# Patient Record
Sex: MALE | Race: WHITE
[De-identification: names, ages, dates, MRNs, and addresses within clinical notes are randomized per-mention and may not be internally consistent; named-entity substitution may affect disease eponyms.]

---

## 2017-09-11 ENCOUNTER — HOSPITAL ENCOUNTER (INPATIENT)
Dept: HOSPITAL 54 - ER | Age: 77
LOS: 3 days | Discharge: HOME | DRG: 280 | End: 2017-09-14
Attending: INTERNAL MEDICINE | Admitting: INTERNAL MEDICINE
Payer: MEDICARE

## 2017-09-11 VITALS — WEIGHT: 175 LBS | BODY MASS INDEX: 26.52 KG/M2 | HEIGHT: 68 IN

## 2017-09-11 VITALS — DIASTOLIC BLOOD PRESSURE: 109 MMHG | SYSTOLIC BLOOD PRESSURE: 161 MMHG

## 2017-09-11 VITALS — SYSTOLIC BLOOD PRESSURE: 165 MMHG | DIASTOLIC BLOOD PRESSURE: 109 MMHG

## 2017-09-11 DIAGNOSIS — I95.1: ICD-10-CM

## 2017-09-11 DIAGNOSIS — I48.2: ICD-10-CM

## 2017-09-11 DIAGNOSIS — N17.9: ICD-10-CM

## 2017-09-11 DIAGNOSIS — F32.9: ICD-10-CM

## 2017-09-11 DIAGNOSIS — M10.9: ICD-10-CM

## 2017-09-11 DIAGNOSIS — I21.4: Primary | ICD-10-CM

## 2017-09-11 DIAGNOSIS — E86.9: ICD-10-CM

## 2017-09-11 DIAGNOSIS — I13.0: ICD-10-CM

## 2017-09-11 DIAGNOSIS — G93.41: ICD-10-CM

## 2017-09-11 DIAGNOSIS — Z86.73: ICD-10-CM

## 2017-09-11 DIAGNOSIS — N17.0: ICD-10-CM

## 2017-09-11 DIAGNOSIS — F41.9: ICD-10-CM

## 2017-09-11 DIAGNOSIS — E78.5: ICD-10-CM

## 2017-09-11 DIAGNOSIS — Z79.01: ICD-10-CM

## 2017-09-11 DIAGNOSIS — N18.3: ICD-10-CM

## 2017-09-11 DIAGNOSIS — E03.9: ICD-10-CM

## 2017-09-11 DIAGNOSIS — I50.33: ICD-10-CM

## 2017-09-11 DIAGNOSIS — D68.59: ICD-10-CM

## 2017-09-11 DIAGNOSIS — N40.0: ICD-10-CM

## 2017-09-11 LAB
ALBUMIN SERPL BCP-MCNC: 3.5 G/DL (ref 3.4–5)
ALP SERPL-CCNC: 103 U/L (ref 46–116)
ALT SERPL W P-5'-P-CCNC: 16 U/L (ref 12–78)
APTT PPP: 41 SEC (ref 23–34)
AST SERPL W P-5'-P-CCNC: 16 U/L (ref 15–37)
BASOPHILS # BLD AUTO: 0 /CMM (ref 0–0.2)
BASOPHILS NFR BLD AUTO: 1 % (ref 0–2)
BILIRUB DIRECT SERPL-MCNC: 0.1 MG/DL (ref 0–0.2)
BILIRUB SERPL-MCNC: 0.5 MG/DL (ref 0.2–1)
BUN SERPL-MCNC: 34 MG/DL (ref 7–18)
CALCIUM SERPL-MCNC: 8.4 MG/DL (ref 8.5–10.1)
CHLORIDE SERPL-SCNC: 105 MMOL/L (ref 98–107)
CO2 SERPL-SCNC: 24 MMOL/L (ref 21–32)
CREAT SERPL-MCNC: 3.2 MG/DL (ref 0.6–1.3)
EOSINOPHIL # BLD AUTO: 0.2 /CMM (ref 0–0.7)
EOSINOPHIL NFR BLD AUTO: 5 % (ref 0–6)
ETHANOL SERPL-MCNC: < 3 MG/DL (ref 0–0)
GLUCOSE SERPL-MCNC: 102 MG/DL (ref 74–106)
HCT VFR BLD AUTO: 33 % (ref 39–51)
HGB BLD-MCNC: 11 G/DL (ref 13.5–17.5)
INR PPP: 3.55 (ref 0.87–1.13)
LYMPHOCYTES NFR BLD AUTO: 0.9 /CMM (ref 0.8–4.8)
LYMPHOCYTES NFR BLD AUTO: 19.6 % (ref 20–44)
MCH RBC QN AUTO: 29 PG (ref 26–33)
MCHC RBC AUTO-ENTMCNC: 33 G/DL (ref 31–36)
MCV RBC AUTO: 89 FL (ref 80–96)
MONOCYTES NFR BLD AUTO: 0.4 /CMM (ref 0.1–1.3)
MONOCYTES NFR BLD AUTO: 8.1 % (ref 2–12)
NEUTROPHILS # BLD AUTO: 3.2 /CMM (ref 1.8–8.9)
NEUTROPHILS NFR BLD AUTO: 66.3 % (ref 43–81)
PH UR STRIP: 5.5 [PH] (ref 5–8)
PLATELET # BLD AUTO: 144 /CMM (ref 150–450)
POTASSIUM SERPL-SCNC: 4.8 MMOL/L (ref 3.5–5.1)
PROT SERPL-MCNC: 6.8 G/DL (ref 6.4–8.2)
PROT UR QL STRIP: >=300 MG/DL
PROTHROMBIN TIME: 39.5 SECS (ref 9.5–12.7)
RBC # BLD AUTO: 3.74 MIL/UL (ref 4.5–6)
RBC #/AREA URNS HPF: (no result) /HPF (ref 0–2)
RDW COEFFICIENT OF VARIATION: 15.3 (ref 11.5–15)
SODIUM SERPL-SCNC: 140 MMOL/L (ref 136–145)
TROPONIN I SERPL-MCNC: 0.15 NG/ML (ref 0–0.06)
TSH SERPL DL<=0.005 MIU/L-ACNC: 70.44 UIU/ML (ref 0.36–3.74)
UROBILINOGEN UR STRIP-MCNC: 0.2 EU/DL
WBC #/AREA URNS HPF: (no result) /HPF (ref 0–3)
WBC NRBC COR # BLD AUTO: 4.7 K/UL (ref 4.3–11)

## 2017-09-11 PROCEDURE — A6402 STERILE GAUZE <= 16 SQ IN: HCPCS

## 2017-09-11 PROCEDURE — G0480 DRUG TEST DEF 1-7 CLASSES: HCPCS

## 2017-09-11 PROCEDURE — A4606 OXYGEN PROBE USED W OXIMETER: HCPCS

## 2017-09-11 PROCEDURE — Z7610: HCPCS

## 2017-09-11 RX ADMIN — CARVEDILOL SCH MG: 12.5 TABLET, FILM COATED ORAL at 21:14

## 2017-09-11 RX ADMIN — NITROGLYCERIN SCH GM: 20 OINTMENT TOPICAL at 21:08

## 2017-09-11 RX ADMIN — ATORVASTATIN CALCIUM SCH MG: 10 TABLET, FILM COATED ORAL at 21:07

## 2017-09-11 NOTE — NUR
TELE RN notes



77 years old male transferred from ER, admitted from home. Patient is awake, A/O x2, 
forgetful. Leads applied for tele monitor, AFIB HR 87. On oxygen at 2LPM via NC, no SOB. IV 
in left hand g18 patent and intact, flushes well. Skin check, left back abrasion noted. NO 
c/o pain at this time. Call light within reach. Made comfortable in bed. Will endorse to 
night shift RN for admission.

## 2017-09-11 NOTE — NUR
TELE RN NOTES

PATIENT ASKING FOR HIS COUMADIN,DR GUZMAN MADE AND HE SAID IT WAS HELD DUE TO HIGH INR 
3.55.EXPLAINED TO PATIENT AND HE FEELS BETTER.

-------------------------------------------------------------------------------

Addendum: 09/12/17 at 0138 by ALONA ARGUETA RN

-------------------------------------------------------------------------------

DR GUZMAN MADE AWARE

## 2017-09-11 NOTE — NUR
elevated /[address] Pearce, ordered nitropaste 0.5 to chest wall q 8HR 
noted and acknowledged.
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
## 2017-09-11 NOTE — NUR
TELE RN NOTES

RECEIVED ON BED AWAKE,ALERT X1-2.WITH PERIODS OF CONFUSION,BREATHING REGULAR NOT IN ANY FORM 
OF DISTRESS.O2 2 LITERS /NC IN USED,O2 SAT 95%.SALINE LOCK LEFT HAND INTACT AND 
PATENT.STARTED ON NS 75ML/HR RATE VI IV PUMP,SITE PATENT ON LEFT HAND #20.BLOOD PRESSURE ON 
THE HIGH SIDE 161/104,STARTED ON COREG 12.5MG PO AS ORDERED,ALONG WITH NITRO BID 0.5MG 
APPLIED TO LEFT CHEST WALL. AFIB 76 ON TELE MONITOR.FALL PRECAUTION OBSERVED,NOTED SKIN 
ABRASION ON LEFT BACK.CLEANSE WITH NS AND APPLIED MEPILEX FOR WOUND CARE.TRANSFERRED TO ROOM 
324 BED 2 NEAR NURSE STATION DUE TO HIGH FALL RISK,NEEDS TO BE VISIBLE FOR SAFETY.CALL LIGHT 
IN REACH,NEEDS ANTICIPATED.

## 2017-09-12 VITALS — DIASTOLIC BLOOD PRESSURE: 99 MMHG | SYSTOLIC BLOOD PRESSURE: 169 MMHG

## 2017-09-12 VITALS — SYSTOLIC BLOOD PRESSURE: 170 MMHG | DIASTOLIC BLOOD PRESSURE: 105 MMHG

## 2017-09-12 VITALS — SYSTOLIC BLOOD PRESSURE: 154 MMHG | DIASTOLIC BLOOD PRESSURE: 89 MMHG

## 2017-09-12 VITALS — DIASTOLIC BLOOD PRESSURE: 89 MMHG | SYSTOLIC BLOOD PRESSURE: 158 MMHG

## 2017-09-12 VITALS — DIASTOLIC BLOOD PRESSURE: 94 MMHG | SYSTOLIC BLOOD PRESSURE: 151 MMHG

## 2017-09-12 VITALS — SYSTOLIC BLOOD PRESSURE: 183 MMHG | DIASTOLIC BLOOD PRESSURE: 112 MMHG

## 2017-09-12 LAB
BASOPHILS # BLD AUTO: 0 /CMM (ref 0–0.2)
BASOPHILS NFR BLD AUTO: 0.5 % (ref 0–2)
BUN SERPL-MCNC: 32 MG/DL (ref 7–18)
CALCIUM SERPL-MCNC: 8.8 MG/DL (ref 8.5–10.1)
CHLORIDE SERPL-SCNC: 109 MMOL/L (ref 98–107)
CO2 SERPL-SCNC: 26 MMOL/L (ref 21–32)
CREAT SERPL-MCNC: 2.9 MG/DL (ref 0.6–1.3)
CREAT UR-MCNC: 48.4 MG/DL (ref 30–125)
EOSINOPHIL # BLD AUTO: 0.3 /CMM (ref 0–0.7)
EOSINOPHIL NFR BLD AUTO: 4.1 % (ref 0–6)
GLUCOSE SERPL-MCNC: 107 MG/DL (ref 74–106)
HCT VFR BLD AUTO: 35 % (ref 39–51)
HGB BLD-MCNC: 11.5 G/DL (ref 13.5–17.5)
LYMPHOCYTES NFR BLD AUTO: 1 /CMM (ref 0.8–4.8)
LYMPHOCYTES NFR BLD AUTO: 15.1 % (ref 20–44)
MAGNESIUM SERPL-MCNC: 1.9 MG/DL (ref 1.8–2.4)
MCH RBC QN AUTO: 29 PG (ref 26–33)
MCHC RBC AUTO-ENTMCNC: 33 G/DL (ref 31–36)
MCV RBC AUTO: 89 FL (ref 80–96)
MONOCYTES NFR BLD AUTO: 0.4 /CMM (ref 0.1–1.3)
MONOCYTES NFR BLD AUTO: 6.2 % (ref 2–12)
NEUTROPHILS # BLD AUTO: 4.7 /CMM (ref 1.8–8.9)
NEUTROPHILS NFR BLD AUTO: 74.1 % (ref 43–81)
PHOSPHATE SERPL-MCNC: 3.1 MG/DL (ref 2.5–4.9)
PLATELET # BLD AUTO: 143 /CMM (ref 150–450)
POTASSIUM SERPL-SCNC: 4.7 MMOL/L (ref 3.5–5.1)
RBC # BLD AUTO: 3.9 MIL/UL (ref 4.5–6)
RDW COEFFICIENT OF VARIATION: 16 (ref 11.5–15)
SODIUM SERPL-SCNC: 144 MMOL/L (ref 136–145)
SODIUM UR-SCNC: 27 MMOL/L (ref 40–220)
WBC NRBC COR # BLD AUTO: 6.3 K/UL (ref 4.3–11)

## 2017-09-12 RX ADMIN — CARVEDILOL SCH MG: 12.5 TABLET, FILM COATED ORAL at 18:13

## 2017-09-12 RX ADMIN — CARVEDILOL SCH MG: 12.5 TABLET, FILM COATED ORAL at 08:33

## 2017-09-12 RX ADMIN — LEVOTHYROXINE SODIUM SCH MCG: 75 TABLET ORAL at 08:32

## 2017-09-12 RX ADMIN — NITROGLYCERIN SCH GM: 20 OINTMENT TOPICAL at 12:34

## 2017-09-12 RX ADMIN — NEOMYCIN AND POLYMYXIN B SULFATES AND BACITRACIN ZINC SCH GM: 400; 3.5; 5 OINTMENT TOPICAL at 11:45

## 2017-09-12 RX ADMIN — SODIUM CHLORIDE PRN MLS/HR: 9 INJECTION, SOLUTION INTRAVENOUS at 15:44

## 2017-09-12 RX ADMIN — CLONIDINE HYDROCHLORIDE PRN MG: 0.1 TABLET ORAL at 05:36

## 2017-09-12 RX ADMIN — PANTOPRAZOLE SODIUM SCH MG: 40 TABLET, DELAYED RELEASE ORAL at 08:32

## 2017-09-12 RX ADMIN — ACETAMINOPHEN PRN MG: 325 TABLET ORAL at 00:30

## 2017-09-12 RX ADMIN — ATORVASTATIN CALCIUM SCH MG: 10 TABLET, FILM COATED ORAL at 21:09

## 2017-09-12 RX ADMIN — NITROGLYCERIN SCH GM: 20 OINTMENT TOPICAL at 04:48

## 2017-09-12 RX ADMIN — NITROGLYCERIN SCH GM: 20 OINTMENT TOPICAL at 21:09

## 2017-09-12 RX ADMIN — ALLOPURINOL SCH MG: 100 TABLET ORAL at 08:31

## 2017-09-12 NOTE — NUR
TELE RN NOTES

/105,PULSE 67,DENIES CHEST PAIN,DUE NITRO BID 0.5MG APPLIED TO RIGHT UPPER CHEST 
WALL.WILL CONTINUE TO MONITOR .

## 2017-09-12 NOTE — NUR
WOUND CARE CONSULT: PT PRESENTS WITH BACK ABRASION, PRESENT ON ADMISSION. PT IS AMBULATORY 
WITH [facility] SCORE CURRENTLY 18. DISCUSSED WITH NURSING STAFF. WILL SEE PRN. MD IN AGREEMENT 
WITH PLAN OF CARE. 

-------------------------------------------------------------------------------

Addendum: 09/12/17 at 1029 by DELMI YUSUF WNDNU

-------------------------------------------------------------------------------

Amended: Links added.

## 2017-09-12 NOTE — NUR
TELE RN NOTES

/105 ,DENIES CHEST PAIN,NO PAIN ANYWHERE ELSE ON HIS BODY,MEDICATED WITH CLONIDINE 
0.1MG PO AS ORDERED PRN FPR HIGH BP.

## 2017-09-12 NOTE — NUR
TELE RN NOTES

SPOKE TO WIFE AND SHE SAID,SHE CALL THEIR PRIMARY DOCTOR IN [place] NAME DR FREDRICK WEBB,THAT SHE WANTS PATIENT TO BE TRANSFER TO [place] FOR PATIENT HAS ALL THE RECORD 
OVER THERE.WILL PASS IT ON TO DAY NURSE.

## 2017-09-12 NOTE — NUR
WIFE IN TO VISIT.WANTS TO GET OOB OFTEN,WALKED IN [place] WITH OXYGEN.NOTED BLUISH TONED 
MOTTLING TO PALMS OF BOTH HANDS,DENIES PAIN TINGLING OR NUMBNESS.GOOD RADIAL PULSES YADIEL.DR. ALFORD ON FLOOR AND CALLED IN TO ROOM TO EXAMINE PT. AWARE COAGS ELEVATED. POX GOOD CIRCULATION 
FEET AND LEGS APPEARS GOOD.DR. ALFORD TO MONITOR HANDS WRAPPED IN WARM BLANKET.

## 2017-09-12 NOTE — NUR
RECEIVED PT. ALERT AND ORIENTED X2-3,ON CALL LIGHT FREQ.FORGETFUL,REQUESTS TO GET OOB 
FREQ.REFUSES IV.

## 2017-09-12 NOTE — NUR
TELE RN OPENING NOTES:



RECEIVED PT IN BED SITTING UPRIGHT. PT COMPLAINS OF NO PAIN. NO S/S OF DISTRESS NOTED AT 
THIS TIME. PT KEPT COMFORTABLE IN BED. BED KEPT IN LOW, LOCKED POSITION, AND SIDE RAILS X 2 
UP. INFORMED PT TO USE CALL LIGHT AND NOT TO GET OUT OF BED AND WANDER. PT HAS IV ON R 
FOREARM #22G AND IS PATENT AND INTACT. PT WAS DISCONNECTED FROM FLUIDS AT THE TIME DUE TO 
WALKING AROUND. WILL CONNECT PT ON FLUIDS. WILL CONTINUE TO MONITOR PT.

## 2017-09-12 NOTE — NUR
TELE RN NOTES

AWAKE.SAYING "iM GOING HOME.RE ORIENT TO PLACE AND NAME.MEDICATED WITH AMBIEN 5MG PO AS 
ORDERED.ALSO MEDICATED WITH TYLENOL 650MG PO FOR MILD PAIN ON HIS BACK ABRASION.

## 2017-09-13 VITALS — SYSTOLIC BLOOD PRESSURE: 149 MMHG | DIASTOLIC BLOOD PRESSURE: 88 MMHG

## 2017-09-13 VITALS — DIASTOLIC BLOOD PRESSURE: 92 MMHG | SYSTOLIC BLOOD PRESSURE: 142 MMHG

## 2017-09-13 VITALS — SYSTOLIC BLOOD PRESSURE: 182 MMHG | DIASTOLIC BLOOD PRESSURE: 101 MMHG

## 2017-09-13 VITALS — DIASTOLIC BLOOD PRESSURE: 101 MMHG | SYSTOLIC BLOOD PRESSURE: 182 MMHG

## 2017-09-13 VITALS — DIASTOLIC BLOOD PRESSURE: 90 MMHG | SYSTOLIC BLOOD PRESSURE: 160 MMHG

## 2017-09-13 VITALS — DIASTOLIC BLOOD PRESSURE: 102 MMHG | SYSTOLIC BLOOD PRESSURE: 154 MMHG

## 2017-09-13 VITALS — DIASTOLIC BLOOD PRESSURE: 99 MMHG | SYSTOLIC BLOOD PRESSURE: 158 MMHG

## 2017-09-13 LAB
BASOPHILS # BLD AUTO: 0 /CMM (ref 0–0.2)
BASOPHILS NFR BLD AUTO: 0.8 % (ref 0–2)
BUN SERPL-MCNC: 31 MG/DL (ref 7–18)
CALCIUM SERPL-MCNC: 8 MG/DL (ref 8.5–10.1)
CHLORIDE SERPL-SCNC: 107 MMOL/L (ref 98–107)
CO2 SERPL-SCNC: 25 MMOL/L (ref 21–32)
CREAT SERPL-MCNC: 2.7 MG/DL (ref 0.6–1.3)
EOSINOPHIL # BLD AUTO: 0.2 /CMM (ref 0–0.7)
EOSINOPHIL NFR BLD AUTO: 3.3 % (ref 0–6)
GLUCOSE SERPL-MCNC: 84 MG/DL (ref 74–106)
HCT VFR BLD AUTO: 33 % (ref 39–51)
HGB BLD-MCNC: 10.6 G/DL (ref 13.5–17.5)
LYMPHOCYTES NFR BLD AUTO: 0.9 /CMM (ref 0.8–4.8)
LYMPHOCYTES NFR BLD AUTO: 15.7 % (ref 20–44)
MCH RBC QN AUTO: 29 PG (ref 26–33)
MCHC RBC AUTO-ENTMCNC: 33 G/DL (ref 31–36)
MCV RBC AUTO: 89 FL (ref 80–96)
MONOCYTES NFR BLD AUTO: 0.5 /CMM (ref 0.1–1.3)
MONOCYTES NFR BLD AUTO: 8.2 % (ref 2–12)
NEUTROPHILS # BLD AUTO: 4.3 /CMM (ref 1.8–8.9)
NEUTROPHILS NFR BLD AUTO: 72 % (ref 43–81)
PLATELET # BLD AUTO: 130 /CMM (ref 150–450)
POTASSIUM SERPL-SCNC: 4.3 MMOL/L (ref 3.5–5.1)
RBC # BLD AUTO: 3.63 MIL/UL (ref 4.5–6)
RDW COEFFICIENT OF VARIATION: 16.3 (ref 11.5–15)
SODIUM SERPL-SCNC: 141 MMOL/L (ref 136–145)
TSH SERPL DL<=0.005 MIU/L-ACNC: 43.99 UIU/ML (ref 0.36–3.74)
WBC NRBC COR # BLD AUTO: 6 K/UL (ref 4.3–11)

## 2017-09-13 RX ADMIN — ALLOPURINOL SCH MG: 100 TABLET ORAL at 08:19

## 2017-09-13 RX ADMIN — CLONIDINE HYDROCHLORIDE PRN MG: 0.1 TABLET ORAL at 00:36

## 2017-09-13 RX ADMIN — ACETAMINOPHEN PRN MG: 325 TABLET ORAL at 04:18

## 2017-09-13 RX ADMIN — CARVEDILOL SCH MG: 12.5 TABLET, FILM COATED ORAL at 16:42

## 2017-09-13 RX ADMIN — CLONIDINE HYDROCHLORIDE PRN MG: 0.1 TABLET ORAL at 17:49

## 2017-09-13 RX ADMIN — CLONIDINE HYDROCHLORIDE PRN MG: 0.1 TABLET ORAL at 22:25

## 2017-09-13 RX ADMIN — SODIUM CHLORIDE PRN MLS/HR: 9 INJECTION, SOLUTION INTRAVENOUS at 04:30

## 2017-09-13 RX ADMIN — NEOMYCIN AND POLYMYXIN B SULFATES AND BACITRACIN ZINC SCH GM: 400; 3.5; 5 OINTMENT TOPICAL at 08:20

## 2017-09-13 RX ADMIN — PANTOPRAZOLE SODIUM SCH MG: 40 TABLET, DELAYED RELEASE ORAL at 08:16

## 2017-09-13 RX ADMIN — NITROGLYCERIN SCH GM: 20 OINTMENT TOPICAL at 04:18

## 2017-09-13 RX ADMIN — CARVEDILOL SCH MG: 12.5 TABLET, FILM COATED ORAL at 08:19

## 2017-09-13 RX ADMIN — ATORVASTATIN CALCIUM SCH MG: 10 TABLET, FILM COATED ORAL at 22:23

## 2017-09-13 RX ADMIN — LEVOTHYROXINE SODIUM SCH MCG: 75 TABLET ORAL at 08:16

## 2017-09-13 NOTE — NUR
RN NOTES



CHANGED WOUND DRESSING ON LEFT LATERAL SIDE OF PT.'S BACK. CLEANED WOUND WITH NORMAL SALINE, 
PATTED DRY, APPLIED ANTIBIOTIC OINTMENT, AND MEPILEX.

## 2017-09-13 NOTE — NUR
TELE/RN NOTES



RECEIVED PT. IN BED A&OX3. TELE READING SINUS RHYTHM BRADYCARDIA WITH INVERTED T WAVE AT 57 
BPM.BREATHING UNLABORED ON OXYGEN 2L/MIN, AND NO SOB. PT. DENIES PAIN, AND IS NOT IN ACUTE 
DISTRESS. IV FLUIDS RUNNING 100 ML/HR. BED IS IN LOWEST POSITION, 2 SIDE RAILS UP, AND 
INSTRUCTED PT. TO USE CALL LIGHT WITHIN REACH. 

-------------------------------------------------------------------------------

Addendum: 09/13/17 at 0759 by TESFAYE GREGG RN

-------------------------------------------------------------------------------

CORRECTION ON OPENING NOTE: PT. TELE READING IS ATRIAL FIBRILLATION AT 76 BPM. PT. HAS 
HISTORY OF ATRIAL FIBRILLATION.

## 2017-09-13 NOTE — NUR
RN NOTES



CHARTING ERROR; PER CARDIOLOGIST ORDERING MD WAS DR. MCCLOUD ORDERED TO DISCONTINUE PT.'S 
NITRO BID TOPICAL MEDICATION DUE TO BLOOD PRESSURE CAN DROP TO LOW IN ADDITION TO HIS 
MEDICATION REGIMEN TODAY.

## 2017-09-13 NOTE — NUR
RN NOTES BP



PT. /102, PULSE 86. ADMINISTERED 0.1 MG OF CLONIDINE PER ORDERS. WILL CONTINUE TO 
ASSESS AND MONITOR.

## 2017-09-13 NOTE — NUR
RN CLOSING NOTES



PT. IN BED A&OX3. BREATHING UNLABORED ON OXYGEN 2L/MIN, AND NO SOB, PT. OXYGEN SATURATION IS 
95% PT. DENIES CHEST PAIN, NO DIZZINESS, AND IS NOT IN ACUTE DISTRESS. IV FLUIDS 
DISCONNECTED AT 1700. PT. HAS IV ACCESS ON RIGHT HAND. DVT PUMPS ARE WORKING ON BOTH LEGS 
BED IS IN LOWEST POSITION, 2 SIDE RAILS UP, AND INSTRUCTED PT. TO USE CALL LIGHT WITHIN 
REACH FOR ASSISTANCE.

## 2017-09-13 NOTE — NUR
TELE RN NOTES:



RIGHT FOREARM #22G IV WAS INFILTRATED. ARM WAS ELEVATED. NEW IV ON R WRIST #24G STARTED.

## 2017-09-13 NOTE — NUR
TELE RN NOTES:



PT'S BLOOD PRESSURE /90. CATAPRES 0.1MG WAS ADMINISTERED. WILL CONTINUE TO MONITOR 
PT'S BP.

## 2017-09-13 NOTE — NUR
MS LVN INITIAL NOTES

RECEIVED IN BED AWAKE AND ALERT SITTING WITH O2 AT 2LITERS VIA NC. NO SIGNS OF ANY 
DISCOMFORT NOTED. HE ONLY REQUEST TONIGHT TO HAVE HIS  BLOOD PRESSURE WILL CHECK AFTER 2 
HRS. DENIES ANY PAIN OR ANY DISCOMFORT NOTED. KEPT HIM WARM AND COMFORTABLE AT ALL TIMES. 
WILL CONTINUE TO MONITOR. PLACE CALL LIGHT AT REACH.

## 2017-09-13 NOTE — NUR
RN NOTES DISCHARGE ON HOLD



PER SYLVESTER LOVELL NP, PT.'S DISCHARGE ORDERS ARE ON HOLD AT THIS TIME.

## 2017-09-13 NOTE — NUR
TELE RN NOTES:



PT COMPLAINED OF MILD HEADACHE. PT WAS ADMINISTERED TYLENOL 650MG. ICE WAS APPLIED ON RIGHT 
ARM WHERE IV SITE WAS INFILTRATED. WILL CONTINUE TO MONITOR PT.

## 2017-09-13 NOTE — NUR
TELE RN CLOSING NOTES:



ALL NEEDS WERE ATTENDED AND ANTICIPATED FOR. PT IS UP RESTING IN BED. PT COMPLAINS OF NO 
PAIN. NO S/S OF DISTRESS NOTED AT THIS TIME. PT KEPT COMFORTABLE IN BED. BED KEPT IN LOW, 
LOCKED POSITION, AND SIDE RAILS X 2 UP. INFORMED PT TO USE CALL LIGHT AND NOT TO GET OUT OF 
BED. PT HAS URINAL AT BEDSIDE. PT HAS IV ON R WRIST#24G AND IS PATENT AND INTACT. PT IS 
BEING INFUSED W/ NS AT 100ML/HR. ENDORSED TO AM NURSE FOR LATRICE.

## 2017-09-13 NOTE — NUR
RN NOTES



NOTIFIED AND SHANNAN LOVELL NP IS AWARE ABOUT PT.'S BLOOD PRESSURE. NO NEW ORDERS AT THIS 
TIME.

## 2017-09-14 VITALS — SYSTOLIC BLOOD PRESSURE: 117 MMHG | DIASTOLIC BLOOD PRESSURE: 105 MMHG

## 2017-09-14 VITALS — DIASTOLIC BLOOD PRESSURE: 90 MMHG | SYSTOLIC BLOOD PRESSURE: 140 MMHG

## 2017-09-14 LAB
APTT PPP: 43 SEC (ref 23–34)
BASOPHILS # BLD AUTO: 0.1 /CMM (ref 0–0.2)
BASOPHILS NFR BLD AUTO: 1 % (ref 0–2)
BUN SERPL-MCNC: 29 MG/DL (ref 7–18)
CALCIUM SERPL-MCNC: 8.5 MG/DL (ref 8.5–10.1)
CHLORIDE SERPL-SCNC: 106 MMOL/L (ref 98–107)
CO2 SERPL-SCNC: 25 MMOL/L (ref 21–32)
CREAT SERPL-MCNC: 2.6 MG/DL (ref 0.6–1.3)
EOSINOPHIL # BLD AUTO: 0.2 /CMM (ref 0–0.7)
EOSINOPHIL NFR BLD AUTO: 2.4 % (ref 0–6)
GLUCOSE SERPL-MCNC: 90 MG/DL (ref 74–106)
HCT VFR BLD AUTO: 35 % (ref 39–51)
HGB BLD-MCNC: 11.6 G/DL (ref 13.5–17.5)
INR PPP: 2.2 (ref 0.87–1.13)
LYMPHOCYTES NFR BLD AUTO: 1 /CMM (ref 0.8–4.8)
LYMPHOCYTES NFR BLD AUTO: 15.9 % (ref 20–44)
MAGNESIUM SERPL-MCNC: 1.7 MG/DL (ref 1.8–2.4)
MCH RBC QN AUTO: 29 PG (ref 26–33)
MCHC RBC AUTO-ENTMCNC: 33 G/DL (ref 31–36)
MCV RBC AUTO: 89 FL (ref 80–96)
MONOCYTES NFR BLD AUTO: 0.4 /CMM (ref 0.1–1.3)
MONOCYTES NFR BLD AUTO: 6.6 % (ref 2–12)
NEUTROPHILS # BLD AUTO: 4.6 /CMM (ref 1.8–8.9)
NEUTROPHILS NFR BLD AUTO: 74.1 % (ref 43–81)
PHOSPHATE SERPL-MCNC: 2.8 MG/DL (ref 2.5–4.9)
PLATELET # BLD AUTO: 139 /CMM (ref 150–450)
POTASSIUM SERPL-SCNC: 4.2 MMOL/L (ref 3.5–5.1)
PROTHROMBIN TIME: 24.8 SECS (ref 9.5–12.7)
RBC # BLD AUTO: 3.95 MIL/UL (ref 4.5–6)
RDW COEFFICIENT OF VARIATION: 15.3 (ref 11.5–15)
SODIUM SERPL-SCNC: 143 MMOL/L (ref 136–145)
WBC NRBC COR # BLD AUTO: 6.2 K/UL (ref 4.3–11)

## 2017-09-14 RX ADMIN — NEOMYCIN AND POLYMYXIN B SULFATES AND BACITRACIN ZINC SCH GM: 400; 3.5; 5 OINTMENT TOPICAL at 09:25

## 2017-09-14 RX ADMIN — PANTOPRAZOLE SODIUM SCH MG: 40 TABLET, DELAYED RELEASE ORAL at 06:31

## 2017-09-14 RX ADMIN — CLONIDINE HYDROCHLORIDE PRN MG: 0.1 TABLET ORAL at 07:46

## 2017-09-14 RX ADMIN — LEVOTHYROXINE SODIUM SCH MCG: 75 TABLET ORAL at 06:31

## 2017-09-14 RX ADMIN — CARVEDILOL SCH MG: 12.5 TABLET, FILM COATED ORAL at 09:26

## 2017-09-14 RX ADMIN — CLONIDINE HYDROCHLORIDE PRN MG: 0.1 TABLET ORAL at 15:47

## 2017-09-14 RX ADMIN — ALLOPURINOL SCH MG: 100 TABLET ORAL at 09:25

## 2017-09-14 NOTE — NUR
LVN/NOTES

 PT SLEEPING COMFORTABLY IN BED WITHOUT ANY ACUTE DISTRESS NOTED. NO SIGNS OF ANY ACUTE 
DISTRESS NOTED. KEPT HIM WARM AND COMFORTABLE AT ALL TIMES. WILL CONTINUE TO MONITOR,.PLACE 
CALL LIGHT AT REACH.

## 2017-09-14 NOTE — NUR
RN NOTES



RECEIVED PATIENT IN BED AWAKE. A/O X 4. NO ACUTE DISTRESS, NO SOB NOTED. DENIES PAIN AT THIS 
TIME. IV SITE INTACT AND PATENT. KEPT SAFE AND COMFORTABLE. BED IN LOW POSITION, LOCKED, 
SIDERAILS UP X2. CALL LIGHT IN REACH. WILL CONTINUE TO MONITOR ACCORDINGLY.

## 2017-09-14 NOTE — NUR
RN NOTES



DISCHARGE PATIENT IN STABLE CONDITION ACCOMPANIED BY DAUGHTER AND TONG MONTAÑO. DISCHARGE 
INSTRUCTIONS/EXITCARE DONE. DISCHARGE PAPERWORK GIVEN TO PATIENT.

## 2017-09-14 NOTE — NUR
MS LVN CLOSING NOTES

PT AWAKE AND ALERT WATCHING TV AT THIS TIME, DENIES ANY PAIN , ALL DUE MEDS GIVEN AND ALL 
NEEDS MET. SLEPT WELL AND STABLE LUIS ALBERTO THE NIGHT. KEPT HIM WARM AND COMFORTABLE AT ALL TIMES. 
PLACE CALL LIGHT AT REACH, ENDORSE TO AM NURSE FOR CONTINUITY OF CARE.

## 2017-11-19 ENCOUNTER — HOSPITAL ENCOUNTER (INPATIENT)
Dept: HOSPITAL 10 - E/R | Age: 77
LOS: 4 days | Discharge: HOME | DRG: 682 | End: 2017-11-23
Attending: INTERNAL MEDICINE | Admitting: INTERNAL MEDICINE
Payer: MEDICARE

## 2017-11-19 VITALS
HEIGHT: 68 IN | WEIGHT: 164.24 LBS | BODY MASS INDEX: 24.89 KG/M2 | BODY MASS INDEX: 24.89 KG/M2 | WEIGHT: 164.24 LBS | HEIGHT: 68 IN

## 2017-11-19 VITALS — RESPIRATION RATE: 20 BRPM | DIASTOLIC BLOOD PRESSURE: 96 MMHG | SYSTOLIC BLOOD PRESSURE: 147 MMHG

## 2017-11-19 VITALS — SYSTOLIC BLOOD PRESSURE: 125 MMHG | DIASTOLIC BLOOD PRESSURE: 85 MMHG | RESPIRATION RATE: 17 BRPM

## 2017-11-19 VITALS — HEART RATE: 106 BPM

## 2017-11-19 VITALS — SYSTOLIC BLOOD PRESSURE: 122 MMHG | DIASTOLIC BLOOD PRESSURE: 72 MMHG | RESPIRATION RATE: 20 BRPM

## 2017-11-19 VITALS — HEART RATE: 102 BPM

## 2017-11-19 VITALS — HEART RATE: 110 BPM

## 2017-11-19 DIAGNOSIS — N17.9: Primary | ICD-10-CM

## 2017-11-19 DIAGNOSIS — Z95.1: ICD-10-CM

## 2017-11-19 DIAGNOSIS — I48.2: ICD-10-CM

## 2017-11-19 DIAGNOSIS — D63.1: ICD-10-CM

## 2017-11-19 DIAGNOSIS — F41.9: ICD-10-CM

## 2017-11-19 DIAGNOSIS — N18.9: ICD-10-CM

## 2017-11-19 DIAGNOSIS — I12.9: ICD-10-CM
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
## 2017-11-19 DIAGNOSIS — G93.40: ICD-10-CM

## 2017-11-19 DIAGNOSIS — E87.5: ICD-10-CM

## 2017-11-19 DIAGNOSIS — R07.89: ICD-10-CM

## 2017-11-19 DIAGNOSIS — Z79.01: ICD-10-CM

## 2017-11-19 DIAGNOSIS — N28.1: ICD-10-CM

## 2017-11-19 DIAGNOSIS — E87.0: ICD-10-CM

## 2017-11-19 DIAGNOSIS — M25.511: ICD-10-CM

## 2017-11-19 DIAGNOSIS — I25.10: ICD-10-CM
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
## 2017-11-19 LAB
ABNORMAL IP MESSAGE: 1
ADD UMIC: YES
ALBUMIN SERPL-MCNC: 3.6 G/DL (ref 3.3–4.9)
ALBUMIN SERPL-MCNC: 4.1 G/DL (ref 3.3–4.9)
ALBUMIN/GLOB SERPL: 1.28 {RATIO}
ALBUMIN/GLOB SERPL: 1.32 {RATIO}
ALP SERPL-CCNC: 105 IU/L (ref 42–121)
ALP SERPL-CCNC: 99 IU/L (ref 42–121)
ALT SERPL-CCNC: 47 IU/L (ref 13–69)
ALT SERPL-CCNC: 56 IU/L (ref 13–69)
ANION GAP SERPL CALC-SCNC: 20 MMOL/L (ref 8–16)
ANION GAP SERPL CALC-SCNC: 20 MMOL/L (ref 8–16)
APAP SERPL-MCNC: < 10 UG/ML (ref 10–30)
APTT BLD: 39.2 SEC (ref 25–35)
AST SERPL-CCNC: 39 IU/L (ref 15–46)
AST SERPL-CCNC: 55 IU/L (ref 15–46)
BASOPHILS # BLD AUTO: 0.1 10^3/UL (ref 0–0.1)
BASOPHILS NFR BLD: 0.9 % (ref 0–2)
BILIRUB DIRECT SERPL-MCNC: 0 MG/DL (ref 0–0.2)
BILIRUB DIRECT SERPL-MCNC: 0 MG/DL (ref 0–0.2)
BILIRUB SERPL-MCNC: 0.9 MG/DL (ref 0.2–1.3)
BILIRUB SERPL-MCNC: 1.2 MG/DL (ref 0.2–1.3)
BUN SERPL-MCNC: 42 MG/DL (ref 7–20)
BUN SERPL-MCNC: 45 MG/DL (ref 7–20)
CALCIUM SERPL-MCNC: 8.5 MG/DL (ref 8.4–10.2)
CALCIUM SERPL-MCNC: 9.1 MG/DL (ref 8.4–10.2)
CHLORIDE SERPL-SCNC: 108 MMOL/L (ref 97–110)
CHLORIDE SERPL-SCNC: 110 MMOL/L (ref 97–110)
CK MB SERPL-MCNC: 4.14 NG/ML (ref 0–2.4)
CK SERPL-CCNC: 232 IU/L (ref 23–200)
CO2 SERPL-SCNC: 22 MMOL/L (ref 21–31)
CO2 SERPL-SCNC: 23 MMOL/L (ref 21–31)
COLOR UR: YELLOW
CREAT SERPL-MCNC: 3.08 MG/DL (ref 0.61–1.24)
CREAT SERPL-MCNC: 3.36 MG/DL (ref 0.61–1.24)
EOSINOPHIL # BLD: 0.3 10^3/UL (ref 0–0.5)
EOSINOPHIL NFR BLD: 4.5 % (ref 0–7)
ERYTHROCYTE [DISTWIDTH] IN BLOOD BY AUTOMATED COUNT: 15.6 % (ref 11.5–14.5)
ETHANOL SERPL-MCNC: < 10 MG/DL
GLOBULIN SER-MCNC: 2.8 G/DL (ref 1.3–3.2)
GLOBULIN SER-MCNC: 3.1 G/DL (ref 1.3–3.2)
GLUCOSE SERPL-MCNC: 105 MG/DL (ref 70–220)
GLUCOSE SERPL-MCNC: 136 MG/DL (ref 70–220)
GLUCOSE UR STRIP-MCNC: NEGATIVE MG/DL
HCT VFR BLD CALC: 28.5 % (ref 42–52)
HGB BLD-MCNC: 9.2 G/DL (ref 14–18)
INR PPP: 2.5
IRON SERPL-MCNC: 41 UG/DL (ref 35–150)
KETONES UR STRIP.AUTO-MCNC: NEGATIVE MG/DL
LYMPHOCYTES # BLD AUTO: 0.7 10^3/UL (ref 0.8–2.9)
LYMPHOCYTES NFR BLD AUTO: 12.1 % (ref 15–51)
MCH RBC QN AUTO: 29.9 PG (ref 29–33)
MCHC RBC AUTO-ENTMCNC: 32.3 G/DL (ref 32–37)
MCV RBC AUTO: 92.5 FL (ref 82–101)
MONOCYTES # BLD: 0.4 10^3/UL (ref 0.3–0.9)
MONOCYTES NFR BLD: 6.6 % (ref 0–11)
NEUTROPHILS # BLD: 4.2 10^3/UL (ref 1.6–7.5)
NEUTROPHILS NFR BLD AUTO: 75.2 % (ref 39–77)
NITRITE UR QL STRIP.AUTO: NEGATIVE MG/DL
NRBC # BLD MANUAL: 0 10^3/UL (ref 0–0)
NRBC BLD AUTO-RTO: 0 /100WBC (ref 0–0)
PLATELET # BLD: 168 10^3/UL (ref 140–415)
PMV BLD AUTO: 13.9 FL (ref 7.4–10.4)
POSITIVE DIFF: (no result)
POTASSIUM SERPL-SCNC: 5.5 MMOL/L (ref 3.5–5.1)
POTASSIUM SERPL-SCNC: 5.9 MMOL/L (ref 3.5–5.1)
PROT SERPL-MCNC: 6.4 G/DL (ref 6.1–8.1)
PROT SERPL-MCNC: 7.2 G/DL (ref 6.1–8.1)
PROTHROMBIN TIME: 27.3 SEC (ref 12.2–14.2)
PT RATIO: 2.1
RBC # BLD AUTO: 3.08 10^6/UL (ref 4.7–6.1)
RBC # UR AUTO: NEGATIVE MG/DL
SALICYLATES SERPL-MCNC: < 1 MG/DL (ref 5–30)
SODIUM SERPL-SCNC: 144 MMOL/L (ref 135–144)
SODIUM SERPL-SCNC: 147 MMOL/L (ref 135–144)
TIBC SERPL-MCNC: 252 UG/DL (ref 241–421)
TROPONIN I SERPL-MCNC: < 0.012 NG/ML (ref 0–0.12)
TROPONIN I SERPL-MCNC: < 0.012 NG/ML (ref 0–0.12)
TSH SERPL-ACNC: 0.12 MIU/L (ref 0.47–4.68)
UR ASCORBIC ACID: NEGATIVE MG/DL
UR BILIRUBIN (DIP): NEGATIVE MG/DL
UR CLARITY: CLEAR
UR PH (DIP): 6 (ref 5–9)
UR RBC: 1 /HPF (ref 0–5)
UR SPECIFIC GRAVITY (DIP): 1.01 (ref 1–1.03)
UR TOTAL PROTEIN (DIP): (no result) MG/DL
UROBILINOGEN UR STRIP-ACNC: NEGATIVE MG/DL
WBC # BLD AUTO: 5.6 10^3/UL (ref 4.8–10.8)
WBC # UR STRIP: NEGATIVE LEU/UL

## 2017-11-19 PROCEDURE — 93306 TTE W/DOPPLER COMPLETE: CPT

## 2017-11-19 PROCEDURE — 97162 PT EVAL MOD COMPLEX 30 MIN: CPT

## 2017-11-19 PROCEDURE — 85025 COMPLETE CBC W/AUTO DIFF WBC: CPT

## 2017-11-19 PROCEDURE — 97116 GAIT TRAINING THERAPY: CPT

## 2017-11-19 PROCEDURE — 82550 ASSAY OF CK (CPK): CPT

## 2017-11-19 PROCEDURE — 84300 ASSAY OF URINE SODIUM: CPT

## 2017-11-19 PROCEDURE — 84443 ASSAY THYROID STIM HORMONE: CPT

## 2017-11-19 PROCEDURE — 83880 ASSAY OF NATRIURETIC PEPTIDE: CPT

## 2017-11-19 PROCEDURE — 80048 BASIC METABOLIC PNL TOTAL CA: CPT

## 2017-11-19 PROCEDURE — 83036 HEMOGLOBIN GLYCOSYLATED A1C: CPT

## 2017-11-19 PROCEDURE — 84132 ASSAY OF SERUM POTASSIUM: CPT

## 2017-11-19 PROCEDURE — 72125 CT NECK SPINE W/O DYE: CPT

## 2017-11-19 PROCEDURE — 76775 US EXAM ABDO BACK WALL LIM: CPT

## 2017-11-19 PROCEDURE — 84439 ASSAY OF FREE THYROXINE: CPT

## 2017-11-19 PROCEDURE — 96375 TX/PRO/DX INJ NEW DRUG ADDON: CPT

## 2017-11-19 PROCEDURE — 80053 COMPREHEN METABOLIC PANEL: CPT

## 2017-11-19 PROCEDURE — 80061 LIPID PANEL: CPT

## 2017-11-19 PROCEDURE — 70450 CT HEAD/BRAIN W/O DYE: CPT

## 2017-11-19 PROCEDURE — 84484 ASSAY OF TROPONIN QUANT: CPT

## 2017-11-19 PROCEDURE — 80162 ASSAY OF DIGOXIN TOTAL: CPT

## 2017-11-19 PROCEDURE — 83540 ASSAY OF IRON: CPT

## 2017-11-19 PROCEDURE — 36415 COLL VENOUS BLD VENIPUNCTURE: CPT

## 2017-11-19 PROCEDURE — 80307 DRUG TEST PRSMV CHEM ANLYZR: CPT

## 2017-11-19 PROCEDURE — 82553 CREATINE MB FRACTION: CPT

## 2017-11-19 PROCEDURE — 84100 ASSAY OF PHOSPHORUS: CPT

## 2017-11-19 PROCEDURE — 93970 EXTREMITY STUDY: CPT

## 2017-11-19 PROCEDURE — 76705 ECHO EXAM OF ABDOMEN: CPT

## 2017-11-19 PROCEDURE — 96361 HYDRATE IV INFUSION ADD-ON: CPT

## 2017-11-19 PROCEDURE — 97530 THERAPEUTIC ACTIVITIES: CPT

## 2017-11-19 PROCEDURE — 96374 THER/PROPH/DIAG INJ IV PUSH: CPT

## 2017-11-19 PROCEDURE — 80306 DRUG TEST PRSMV INSTRMNT: CPT

## 2017-11-19 PROCEDURE — 82728 ASSAY OF FERRITIN: CPT

## 2017-11-19 PROCEDURE — 83690 ASSAY OF LIPASE: CPT

## 2017-11-19 PROCEDURE — 85730 THROMBOPLASTIN TIME PARTIAL: CPT

## 2017-11-19 PROCEDURE — 74176 CT ABD & PELVIS W/O CONTRAST: CPT

## 2017-11-19 PROCEDURE — 93005 ELECTROCARDIOGRAM TRACING: CPT

## 2017-11-19 PROCEDURE — 85610 PROTHROMBIN TIME: CPT

## 2017-11-19 PROCEDURE — 81001 URINALYSIS AUTO W/SCOPE: CPT

## 2017-11-19 PROCEDURE — 71010: CPT

## 2017-11-19 PROCEDURE — 83735 ASSAY OF MAGNESIUM: CPT

## 2017-11-19 RX ADMIN — LORAZEPAM PRN MG: 0.5 TABLET ORAL at 21:29

## 2017-11-19 RX ADMIN — HYDROCODONE BITARTRATE AND ACETAMINOPHEN PRN TAB: 5; 325 TABLET ORAL at 23:53

## 2017-11-19 RX ADMIN — METOPROLOL TARTRATE SCH MG: 25 TABLET, FILM COATED ORAL at 21:30
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
## 2017-11-19 NOTE — RADRPT
PROCEDURE:   CT ABDOMEN AND PELVIS WITHOUT CONTRAST. 

 

CLINICAL INDICATION:   Abdominal pain

 

TECHNIQUE:   CT scan of the abdomen and pelvis without contrast was performed on a multidetector hig
h-resolution CT scanner. The patient was scanned without intravenous contrast.  Coronal and sagittal
 reformatted images were obtained from the axial source images. Images were reviewed on a high-resol
[employer] PACS workstation. The total exam CTDI equals 9.2 mGy and the total exam DLP equals 608.1 mGy-c
m.

 

One or more of the following dose reduction techniques were used:

Automated exposure control.

Adjustment of the mA and/or kV according to patient size.

Use of iterative reconstruction technique.

 

DICOM images are available

 

COMPARISON:   None 

 

FINDINGS:

 

CT abdomen:

 

Bilateral lower lobe atelectasis and chronic lung changes are noted. There are ground-glass opacitie
s and small right-sided pleural effusion. Heart size is enlarged. There is no significant pericardia
l effusion. Coronary atherosclerotic calcifications are noted.

 

Hepatic morphology is within limits. No gross contour deforming masses. Gallstones are identified. T
here is thickening of the gallbladder wall with pericholecystic fluid.

 

The spleen and pancreas are within normal limits.

 

Both adrenal glands are identified in the there appears to be a left adrenal gland nodule, measuring
 1.8 cm, which is indeterminate.

 

Both kidneys are normal anatomic position. Bilateral renal cysts are noted. The largest on the left 
measures 3.1 cm. The largest on the right, measures 9.4 mm. No gross renal/ureteric calculi. No evid
ence of obstruction or hydronephrosis.

 

The visualized GI tract demonstrates a small hiatal hernia. Normal caliber loops of small and large 
bowel noted. No evidence of bowel obstruction. Stool filled loops of large bowel suggestive of const
ipation. The appendix is not visualized, however no inflammatory changes within right lower quadrant
.

 

There is atherosclerotic calcification of the aorta. Mild aneurysmal dilatation of the infrarenal ao
rta measuring up to 3.5 cm noted. Several shoddy retroperitoneal lymph nodes are identified.

 

CT pelvis:

 

The bladder is within normal limits. The prostate gland is calcified and normal size. There are fat-
containing bilateral inguinal hernias. There is sigmoid diverticulosis without diverticulitis. No fr
ee fluid or free air. No gross focal fluid collections.

 

The visualized osseous structures demonstrate multilevel degenerative disease of the spine.

 

IMPRESSION:

 

1. Gallstones with possible thickening of the gallbladder wall and pericholecystic fluid. Recommend 
correlation with ultrasound. Cannot exclude cholecystitis.

2. No evidence of bowel obstruction. Stool filled loops of large bowel suggestive of constipation. S
igmoid colon diverticulosis.

3. Diffuse atherosclerosis of the aorta and aneurysmal dilatation of the infrarenal aorta measuring 
up to 3.5 cm.

4. Bilateral renal cysts. No evidence of obstruction or hydronephrosis.

5. No free fluid or free air. No gross focal fluid collections.

6. Indeterminate 1.8 cm left adrenal gland nodule.

7. Fat-containing bilateral inguinal hernias.

 

RPTAT: EE

_____________________________________________ 

Physician Iam           Date    Time 

Electronically viewed and signed by Physician Iam on 11/19/2017 09:46 

 

D:  11/19/2017 09:46  T:  11/19/2017 09:46

JAMES/

## 2017-11-19 NOTE — RADRPT
PROCEDURE:   US bilateral lower extremity veins. 

 

CLINICAL INDICATION:   Bilateral leg pain and swelling. 

 

TECHNIQUE:   Multiple longitudinal and transverse images of the bilateral lower extremity veins were
 obtained with gray scale and color Doppler imaging. The common femoral vein, femoral vein, and popl
iteal vein were evaluated. 2D grayscale measurements with compression sonography, color Doppler, and
 pulsed Doppler with augmentation.   

 

COMPARISON:   No prior studies are available for comparison. 

 

FINDINGS:

The bilateral common femoral, femoral and popliteal veins are normally compressible throughout.  Col
or flow demonstrates normal filling of the vessels.  Normal waveforms are visualized and there is no
rmal response to augmentation.    

 

IMPRESSION:

1.  No evidence of deep vein thrombosis involving either lower extremity. 

 

RPTAT: QQ

_____________________________________________ 

.Calos Wilkinson MD, MD           Date    Time 

Electronically viewed and signed by .Calos Wilkinson MD, MD on 11/19/2017 17:31 

 

D:  11/19/2017 17:31  T:  11/19/2017 17:31

.R/
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
## 2017-11-19 NOTE — RADRPT
PROCEDURE:   XR Chest. 

 

CLINICAL INDICATION:  Shortness of breath

 

TECHNIQUE:   Single portable view of the chest was obtained 

 

COMPARISON:   No priors for comparison

 

FINDINGS:

 

The trachea is midline. Cardiomegaly and pulmonary vascularity are prominent. The lungs are clear. T
he costophrenic angles are sharp. 

 

 

IMPRESSION:

 

1. Cardiomegaly and pulmonary vascular congestion.

 

RPTAT: EE

_____________________________________________ 

Physician Iam           Date    Time 

Electronically viewed and signed by Physician Iam on 11/19/2017 10:12 

 

D:  11/19/2017 10:12  T:  11/19/2017 10:12

JL/
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
## 2017-11-19 NOTE — RADRPT
PROCEDURE:   CT cervical spine without contrast 

 

CLINICAL INDICATION:  Altered mental status

 

TECHNIQUE:   CT scan of the cervical spine was performed.  No IV contrast was administered.  Coronal
 and sagittal reformatted images were obtained from the axial source images. Images were reviewed on
 a high-resolution PACS workstation. The calculated radiation dose measures 542.89 mGy centimeters. 
The CTDI measures 22.28 mGy.

 

One or more of the following dose reduction techniques were used:

- Automated exposure control.

- Adjustment of the mA and/or kV according to patient size .

- Use of iterative reconstruction technique.

- DICOM images area available

 

Images were reviewed on a high-resolution PACS workstation

 

COMPARISON:   None. 

 

FINDINGS:

 

There is reversal of the normal cervical lordosis.  There are no acute vertebral body fractures. Mul
tilevel degenerative disc disease seen noting severe narrowing at C5-C6 and C6-C7. There is mild ant
erolisthesis at C3-C4 and to a lesser extent at C4-C5. Trace retrolisthesis seen at C5-C6.

 

The paraspinal soft tissues are within normal limits.

 

 

C2-3: The osseous central canal is patent. Mild right neural foraminal stenosis. Right facet joint f
usion. Severe left facet arthrosis. 

 

C3-4: Patent central canal. Moderate to severe bilateral neural foraminal stenosis. Severe bilateral
 facet arthrosis. 

 

C4-5: Patent central canal. Severe left neural foraminal stenosis. The right neural foramen. Severe 
left facet arthrosis. Mild right facet arthrosis. 

 

C5-6: Disk/osteophyte complex formation contributing to at least moderate central canal stenosis. Mo
derate to severe bilateral neural foraminal stenosis. Mild bilateral facet arthrosis. 

 

C6-7: Patent osseous central canal. Moderate right neural foraminal stenosis. Mild left neural margi
inal stenosis. Mild bilateral facet arthrosis.

 

C7-T1: Trace anterolisthesis. No osseous spinal stenosis. 

 

IMPRESSION:

 

1.  No acute fracture or traumatic subluxation.

2.  Reversal of the normal cervical lordosis related to paraspinal muscle spasm or positioning.

3.  Multilevel degenerative disc disease noting severe disc space narrowing at C5-C6 and C6 C7.

4.  Multilevel neural foraminal stenosis.

 

RPTAT: 

_____________________________________________ 

.Rik Stewart MD, MD           Date    Time 

Electronically viewed and signed by .Rik Stewart MD, MD on 11/19/2017 09:53 

 

D:  11/19/2017 09:53  T:  11/19/2017 09:53

.d/

## 2017-11-19 NOTE — RADRPT
PROCEDURE:   US right upper quadrant abdomen. 

 

CLINICAL INDICATION:   Abdominal pain

 

TECHNIQUE:   Multiple real-time images were acquired of the patient's right upper quadrant abdomen  
utilizing a high resolution transducer. 

 

COMPARISON:   None

 

FINDINGS:

The liver demonstrates normal echogenicity and normal size without focal lesions. Patent portal vein
. Nondistended gallbladder with stones. Mild gallbladder wall thickening likely related to non diste
nsion. No pericholecystic fluid. Negative sonographic Wilcox's sign.  No intrahepatic or extrahepati
c biliary dilatation.  The common bile duct measures  4.5 mm in maximal dimension.  Pancreas is obsc
ured by bowel gas. Right kidney is obscured by bowel gas. Normal caliber aorta and IVC. No peritonea
l free fluid.

 

IMPRESSION:

Nondistended gallbladder with gallstones. Gallbladder wall thickening likely related to non distensi
on. If clinical concern for a cholecystitis recommend a HIDA scan.

 

RPTAT:AAJJ

_____________________________________________ 

Physician Quintin           Date    Time 

Electronically viewed and signed by Physician Quintin on 11/19/2017 11:08 

 

D:  11/19/2017 11:08  T:  11/19/2017 11:08

/

## 2017-11-20 VITALS — RESPIRATION RATE: 19 BRPM | DIASTOLIC BLOOD PRESSURE: 93 MMHG | SYSTOLIC BLOOD PRESSURE: 161 MMHG

## 2017-11-20 VITALS — HEART RATE: 114 BPM

## 2017-11-20 VITALS — RESPIRATION RATE: 20 BRPM | DIASTOLIC BLOOD PRESSURE: 88 MMHG | SYSTOLIC BLOOD PRESSURE: 149 MMHG

## 2017-11-20 VITALS — RESPIRATION RATE: 18 BRPM | SYSTOLIC BLOOD PRESSURE: 153 MMHG | DIASTOLIC BLOOD PRESSURE: 101 MMHG

## 2017-11-20 VITALS — DIASTOLIC BLOOD PRESSURE: 67 MMHG | RESPIRATION RATE: 18 BRPM | SYSTOLIC BLOOD PRESSURE: 113 MMHG

## 2017-11-20 VITALS — DIASTOLIC BLOOD PRESSURE: 113 MMHG | RESPIRATION RATE: 19 BRPM | SYSTOLIC BLOOD PRESSURE: 173 MMHG

## 2017-11-20 VITALS — HEART RATE: 105 BPM

## 2017-11-20 VITALS — DIASTOLIC BLOOD PRESSURE: 112 MMHG | SYSTOLIC BLOOD PRESSURE: 179 MMHG | RESPIRATION RATE: 19 BRPM

## 2017-11-20 VITALS — HEART RATE: 124 BPM

## 2017-11-20 VITALS — HEART RATE: 150 BPM

## 2017-11-20 VITALS — HEART RATE: 90 BPM

## 2017-11-20 VITALS — HEART RATE: 101 BPM

## 2017-11-20 VITALS — HEART RATE: 110 BPM

## 2017-11-20 LAB
ABNORMAL IP MESSAGE: 1
ALBUMIN SERPL-MCNC: 3.6 G/DL (ref 3.3–4.9)
ALBUMIN/GLOB SERPL: 1.12 {RATIO}
ALP SERPL-CCNC: 100 IU/L (ref 42–121)
ALT SERPL-CCNC: 43 IU/L (ref 13–69)
ANION GAP SERPL CALC-SCNC: 16 MMOL/L (ref 8–16)
AST SERPL-CCNC: 34 IU/L (ref 15–46)
BASOPHILS # BLD AUTO: 0.1 10^3/UL (ref 0–0.1)
BASOPHILS NFR BLD: 1 % (ref 0–2)
BILIRUB DIRECT SERPL-MCNC: 0 MG/DL (ref 0–0.2)
BILIRUB SERPL-MCNC: 1.6 MG/DL (ref 0.2–1.3)
BUN SERPL-MCNC: 43 MG/DL (ref 7–20)
CALCIUM SERPL-MCNC: 8.8 MG/DL (ref 8.4–10.2)
CHLORIDE SERPL-SCNC: 112 MMOL/L (ref 97–110)
CHOLEST SERPL-MCNC: 110 MG/DL (ref 100–200)
CHOLEST/HDLC SERPL: 3.7 RATIO
CK MB SERPL-MCNC: 3.6 NG/ML (ref 0–2.4)
CK SERPL-CCNC: 327 IU/L (ref 23–200)
CO2 SERPL-SCNC: 26 MMOL/L (ref 21–31)
CREAT SERPL-MCNC: 3.14 MG/DL (ref 0.61–1.24)
EOSINOPHIL # BLD: 0.1 10^3/UL (ref 0–0.5)
EOSINOPHIL NFR BLD: 1.4 % (ref 0–7)
ERYTHROCYTE [DISTWIDTH] IN BLOOD BY AUTOMATED COUNT: 15.5 % (ref 11.5–14.5)
GLOBULIN SER-MCNC: 3.2 G/DL (ref 1.3–3.2)
GLUCOSE SERPL-MCNC: 115 MG/DL (ref 70–220)
HCT VFR BLD CALC: 25.9 % (ref 42–52)
HDLC SERPL-MCNC: 29 MG/DL (ref 31–75)
HGB BLD-MCNC: 8.3 G/DL (ref 14–18)
INR PPP: 1.75
LYMPHOCYTES # BLD AUTO: 1.1 10^3/UL (ref 0.8–2.9)
LYMPHOCYTES NFR BLD AUTO: 13.3 % (ref 15–51)
MAGNESIUM SERPL-MCNC: 1.7 MG/DL (ref 1.7–2.5)
MCH RBC QN AUTO: 30 PG (ref 29–33)
MCHC RBC AUTO-ENTMCNC: 32 G/DL (ref 32–37)
MCV RBC AUTO: 93.5 FL (ref 82–101)
MONOCYTES # BLD: 0.6 10^3/UL (ref 0.3–0.9)
MONOCYTES NFR BLD: 7.1 % (ref 0–11)
NEUTROPHILS # BLD: 6.4 10^3/UL (ref 1.6–7.5)
NEUTROPHILS NFR BLD AUTO: 76.8 % (ref 39–77)
NRBC # BLD MANUAL: 0 10^3/UL (ref 0–0)
NRBC BLD AUTO-RTO: 0 /100WBC (ref 0–0)
PHOSPHATE SERPL-MCNC: 4.1 MG/DL (ref 2.5–4.9)
PLATELET # BLD: 163 10^3/UL (ref 140–415)
PMV BLD AUTO: 13.3 FL (ref 7.4–10.4)
POSITIVE DIFF: (no result)
POTASSIUM SERPL-SCNC: 5 MMOL/L (ref 3.5–5.1)
PROT SERPL-MCNC: 6.8 G/DL (ref 6.1–8.1)
PROTHROMBIN TIME: 20.6 SEC (ref 12.2–14.2)
PT RATIO: 1.6
RBC # BLD AUTO: 2.77 10^6/UL (ref 4.7–6.1)
SODIUM SERPL-SCNC: 149 MMOL/L (ref 135–144)
TRIGL SERPL-MCNC: 95 MG/DL (ref 0–149)
TROPONIN I SERPL-MCNC: 0.02 NG/ML (ref 0–0.12)
WBC # BLD AUTO: 8.4 10^3/UL (ref 4.8–10.8)

## 2017-11-20 RX ADMIN — METOPROLOL TARTRATE SCH MG: 25 TABLET, FILM COATED ORAL at 08:47

## 2017-11-20 RX ADMIN — HYDRALAZINE HYDROCHLORIDE PRN MG: 20 INJECTION INTRAMUSCULAR; INTRAVENOUS at 14:40

## 2017-11-20 NOTE — RADRPT
Echocardiogram Report

 

Patient Name:  RAFAEL TIRADO         Gender:       Male

MRN:           [id number]              Accession #:  [id number]

Birth Date:    1940          Study Date:   20-Nov-2017

Sonographer:   Javed Holbrook [facility]  Location:     [address]

 

Ref. Physician: NISHA LAWRENCE

Quality: Adequate

 

Procedures: Transthoracic echocardiogram with complete 2D, M-Mode, and 

doppler examination.

Indications: Possible CHF, dyspnea.

 

2D/M Mode                          Doppler

Measurement  Value  Normal Ranges  Measurement    Value  Normal Ranges

LVIDd 2D     5.3    3.5 - 5.6 cm   AV Peak Jose    1.1    m/sec

LVIDs 2D     4.0    2.1 - 4.1 cm   AV Peak PG     4.8    mmHg

LVPWd 2D     1.1    0.6 - 1.1 cm   LVOT Peak Jose  0.9    m/sec

IVSd 2D      1.1    0.6 - 1.1 cm   LVOT Peak PG   3.2    mmHg

EDV 2D       135.2  cm3            TR Peak Jose    3.8    m/sec

ESV 2D       62.9   cm3            TR Peak PG     57.0   mmHg

RVSP           67.0   mmHg

 

Findings

Left Ventricle: Normal left ventricular systolic function.  Normal left 

ventricular cavity size.  Left ventricular wall thickness upper limits 

of normal.  Ejection fraction is visually estimated at 55 %.  Abnormal 

Diastolic Function.

Right Ventricle: Normal right ventricular size.  Mild right ventricular 

systolic dysfunction.

Left Atrium: There is moderate enlargement of left atrium.

Right Atrium: The right atrium is normal in size.

Mitral Valve: Mild mitral leaflet calcification.  Mild mitral annular 

calcification.  Mild to moderate mitral valve regurgitation.

Aortic Valve: Aortic valve not well visualized.  Aortic sclerosis 

without stenosis.  Aortic cusps appear mildly calcified.  Trace to mild 

aortic valve regurgitation.

Tricuspid Valve: Normal appearance of the tricuspid valve.  Estimated 

peak PA systolic pressure 62 mmHg.  There is mild to moderate tricuspid 

regurgitation.

Pulmonic Valve: Pulmonic valve not well visualized.  There is trace 

pulmonic regurgitation.

Pericardium: Normal pericardium with no significant pericardial effusion.

Aorta: Normal aortic root.

IVC: Dilated inferior vena cava with poor inspiratory collapse 

consistent with elevated right atrial pressures.

 

Conclusions

1.Normal left ventricular systolic function.  Normal left ventricular 

cavity size.  Left ventricular wall thickness upper limits of normal.  

Ejection fraction is visually estimated at 55 %.  Abnormal Diastolic 

Function.

2.There is moderate enlargement of left atrium.

3.Mild mitral leaflet calcification.  Mild mitral annular 

calcification.  Mild to moderate mitral valve regurgitation.

4.Aortic valve not well visualized.  Aortic sclerosis without stenosis. 

 Aortic cusps appear mildly calcified.  Trace to mild aortic valve 

regurgitation.

5.Normal appearance of the tricuspid valve.  Estimated peak PA systolic 

pressure 62 mmHg.  There is mild to moderate tricuspid regurgitation.

6.Dilated inferior vena cava with poor inspiratory collapse consistent 

with elevated right atrial pressures.

 

Electronically Signed By:

Almas Holley

20-Nov-2017 18:20:06  -0800

 

Patient Name: RAFAEL TIRADO

MRN: [id number]

Study Date: 20-Nov-2017

 

[id number]
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
## 2017-11-20 NOTE — RADRPT
PROCEDURE:  US kidney

 

CLINICAL INDICATION:  Acute kidney insufficiency 

 

TECHNIQUE:  Multiple real-time images were acquired 

 

COMPARISON:  Abdomen/pelvis CT 11/19/2017

 

FINDINGS:

 

The right kidney measures 8.4 cm in length.  The left kidney measures 9.8 cm in length.

 

Bilateral kidney parenchyma increased echogenicity. Multiple bilateral kidney small (less than or eq
ual to 2.8 cm in greatest dimension) hypoechoic findings, many of which are too small to characteriz
e, and some of which may be complex.  Of note, kidney stones may not be visualized by sonography.

 

No abnormal perirenal fluid collections seen.

 

No hydronephrosis seen.

 

Limited images of the partially distended bladder reveal no significant abnormalities.

 

4.3 x 3.5 cm infrarenal abdominal aortic aneurysm corresponds to the aneurysm seen on the CT scan fr
om yesterday, though it measures larger on this sonogram

 

IMPRESSION:

 

1.  Bilateral kidney parenchyma increased echogenicity, possibly secondary to medical renal disease.
 Multiple bilateral kidney hypoechoic findings, many of which are too small to characterize; most of
 these likely are cystic, but some may be complex cystic.

 

2.  Infrarenal abdominal aortic aneurysm that measures 4.3 x 3.5 cm on this sonogram.

 

Compare to older corresponding imaging studies; if these are unavailable, follow-up imaging is recom
mended.

 

RPTAT: TT

_____________________________________________ 

Physician Lucia           Date    Time 

Electronically viewed and signed by Physician Lucia on 11/20/2017 16:39 

 

D:  11/20/2017 16:39  T:  11/20/2017 16:39

JS/

## 2017-11-20 NOTE — RADRPT
PROCEDURE:   XR Chest. 

 

CLINICAL INDICATION:   Shortness of breath.

 

TECHNIQUE:   Single frontal view. 

 

COMPARISON:   11/19/2017. 

 

FINDINGS:

Mild pulmonary edema is unchanged. The lungs are otherwise clear. 

The heart is mildly enlarged. There is calcification in the aorta consistent with atherosclerosis. T
here are sternal wires. 

There is no pleural effusion. 

There is no pneumothorax.   

 

IMPRESSION:

1.  No change from the 11/19/2017 chest radiograph.

 

RPTAT: QQ

_____________________________________________ 

.Calos Wilkinson MD, MD           Date    Time 

Electronically viewed and signed by .Calos Wilkinson MD, MD on 11/20/2017 13:22 

 

D:  11/20/2017 13:22  T:  11/20/2017 13:22

.R/
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
## 2017-11-21 VITALS — RESPIRATION RATE: 18 BRPM | DIASTOLIC BLOOD PRESSURE: 85 MMHG | SYSTOLIC BLOOD PRESSURE: 136 MMHG

## 2017-11-21 VITALS — DIASTOLIC BLOOD PRESSURE: 72 MMHG | SYSTOLIC BLOOD PRESSURE: 103 MMHG | RESPIRATION RATE: 20 BRPM

## 2017-11-21 VITALS — SYSTOLIC BLOOD PRESSURE: 170 MMHG | RESPIRATION RATE: 18 BRPM | DIASTOLIC BLOOD PRESSURE: 106 MMHG

## 2017-11-21 VITALS — SYSTOLIC BLOOD PRESSURE: 177 MMHG | RESPIRATION RATE: 17 BRPM | DIASTOLIC BLOOD PRESSURE: 110 MMHG

## 2017-11-21 VITALS — DIASTOLIC BLOOD PRESSURE: 105 MMHG | RESPIRATION RATE: 20 BRPM | SYSTOLIC BLOOD PRESSURE: 176 MMHG

## 2017-11-21 VITALS — HEART RATE: 118 BPM

## 2017-11-21 VITALS — DIASTOLIC BLOOD PRESSURE: 97 MMHG | SYSTOLIC BLOOD PRESSURE: 166 MMHG | RESPIRATION RATE: 20 BRPM

## 2017-11-21 VITALS — HEART RATE: 125 BPM

## 2017-11-21 VITALS — HEART RATE: 120 BPM

## 2017-11-21 VITALS — HEART RATE: 116 BPM

## 2017-11-21 VITALS — HEART RATE: 91 BPM

## 2017-11-21 VITALS — SYSTOLIC BLOOD PRESSURE: 145 MMHG | RESPIRATION RATE: 19 BRPM | DIASTOLIC BLOOD PRESSURE: 78 MMHG

## 2017-11-21 LAB
ABNORMAL IP MESSAGE: 1
ALBUMIN SERPL-MCNC: 3.4 G/DL (ref 3.3–4.9)
ALBUMIN/GLOB SERPL: 1.06 {RATIO}
ALP SERPL-CCNC: 97 IU/L (ref 42–121)
ALT SERPL-CCNC: 39 IU/L (ref 13–69)
ANION GAP SERPL CALC-SCNC: 15 MMOL/L (ref 8–16)
AST SERPL-CCNC: 29 IU/L (ref 15–46)
BASOPHILS # BLD AUTO: 0 10^3/UL (ref 0–0.1)
BASOPHILS NFR BLD: 0.6 % (ref 0–2)
BILIRUB DIRECT SERPL-MCNC: 0 MG/DL (ref 0–0.2)
BILIRUB SERPL-MCNC: 1.8 MG/DL (ref 0.2–1.3)
BUN SERPL-MCNC: 40 MG/DL (ref 7–20)
CALCIUM SERPL-MCNC: 8.8 MG/DL (ref 8.4–10.2)
CHLORIDE SERPL-SCNC: 109 MMOL/L (ref 97–110)
CHOLEST SERPL-MCNC: 107 MG/DL (ref 100–200)
CHOLEST/HDLC SERPL: 3.6 RATIO
CO2 SERPL-SCNC: 26 MMOL/L (ref 21–31)
CREAT SERPL-MCNC: 2.77 MG/DL (ref 0.61–1.24)
EOSINOPHIL # BLD: 0.1 10^3/UL (ref 0–0.5)
EOSINOPHIL NFR BLD: 1 % (ref 0–7)
ERYTHROCYTE [DISTWIDTH] IN BLOOD BY AUTOMATED COUNT: 15.6 % (ref 11.5–14.5)
GLOBULIN SER-MCNC: 3.2 G/DL (ref 1.3–3.2)
GLUCOSE SERPL-MCNC: 99 MG/DL (ref 70–220)
HCT VFR BLD CALC: 24.3 % (ref 42–52)
HDLC SERPL-MCNC: 29 MG/DL (ref 31–75)
HGB BLD-MCNC: 7.7 G/DL (ref 14–18)
INR PPP: 1.61
LYMPHOCYTES # BLD AUTO: 1 10^3/UL (ref 0.8–2.9)
LYMPHOCYTES NFR BLD AUTO: 14.8 % (ref 15–51)
MAGNESIUM SERPL-MCNC: 1.7 MG/DL (ref 1.7–2.5)
MCH RBC QN AUTO: 29.5 PG (ref 29–33)
MCHC RBC AUTO-ENTMCNC: 31.7 G/DL (ref 32–37)
MCV RBC AUTO: 93.1 FL (ref 82–101)
MONOCYTES # BLD: 0.7 10^3/UL (ref 0.3–0.9)
MONOCYTES NFR BLD: 10.5 % (ref 0–11)
NEUTROPHILS # BLD: 5.1 10^3/UL (ref 1.6–7.5)
NEUTROPHILS NFR BLD AUTO: 72.7 % (ref 39–77)
NRBC # BLD MANUAL: 0 10^3/UL (ref 0–0)
NRBC BLD AUTO-RTO: 0 /100WBC (ref 0–0)
PLATELET # BLD: 139 10^3/UL (ref 140–415)
PMV BLD AUTO: 13.4 FL (ref 7.4–10.4)
POSITIVE DIFF: (no result)
POTASSIUM SERPL-SCNC: 4.3 MMOL/L (ref 3.5–5.1)
PROT SERPL-MCNC: 6.6 G/DL (ref 6.1–8.1)
PROTHROMBIN TIME: 19.3 SEC (ref 12.2–14.2)
PT RATIO: 1.5
RBC # BLD AUTO: 2.61 10^6/UL (ref 4.7–6.1)
SODIUM SERPL-SCNC: 146 MMOL/L (ref 135–144)
TRIGL SERPL-MCNC: 61 MG/DL (ref 0–149)
TSH SERPL-ACNC: 0.19 MIU/L (ref 0.47–4.68)
WBC # BLD AUTO: 7 10^3/UL (ref 4.8–10.8)

## 2017-11-21 RX ADMIN — LORAZEPAM PRN MG: 0.5 TABLET ORAL at 22:05

## 2017-11-21 RX ADMIN — HYDROCODONE BITARTRATE AND ACETAMINOPHEN PRN TAB: 5; 325 TABLET ORAL at 03:41

## 2017-11-21 RX ADMIN — HYDROCODONE BITARTRATE AND ACETAMINOPHEN PRN TAB: 5; 325 TABLET ORAL at 23:22

## 2017-11-21 RX ADMIN — HYDRALAZINE HYDROCHLORIDE PRN MG: 20 INJECTION INTRAMUSCULAR; INTRAVENOUS at 18:48
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
## 2017-11-22 VITALS — DIASTOLIC BLOOD PRESSURE: 97 MMHG | RESPIRATION RATE: 17 BRPM | SYSTOLIC BLOOD PRESSURE: 154 MMHG

## 2017-11-22 VITALS — DIASTOLIC BLOOD PRESSURE: 95 MMHG | SYSTOLIC BLOOD PRESSURE: 170 MMHG | RESPIRATION RATE: 20 BRPM

## 2017-11-22 VITALS — SYSTOLIC BLOOD PRESSURE: 130 MMHG | DIASTOLIC BLOOD PRESSURE: 93 MMHG | HEART RATE: 90 BPM

## 2017-11-22 VITALS — RESPIRATION RATE: 17 BRPM | DIASTOLIC BLOOD PRESSURE: 78 MMHG | SYSTOLIC BLOOD PRESSURE: 152 MMHG

## 2017-11-22 VITALS — HEART RATE: 103 BPM

## 2017-11-22 VITALS — HEART RATE: 110 BPM

## 2017-11-22 VITALS — HEART RATE: 114 BPM

## 2017-11-22 VITALS — RESPIRATION RATE: 19 BRPM | SYSTOLIC BLOOD PRESSURE: 177 MMHG | DIASTOLIC BLOOD PRESSURE: 109 MMHG

## 2017-11-22 VITALS — HEART RATE: 100 BPM

## 2017-11-22 VITALS — HEART RATE: 94 BPM | DIASTOLIC BLOOD PRESSURE: 81 MMHG | SYSTOLIC BLOOD PRESSURE: 156 MMHG

## 2017-11-22 VITALS — RESPIRATION RATE: 18 BRPM | DIASTOLIC BLOOD PRESSURE: 104 MMHG | SYSTOLIC BLOOD PRESSURE: 163 MMHG

## 2017-11-22 VITALS — HEART RATE: 84 BPM

## 2017-11-22 VITALS — HEART RATE: 131 BPM

## 2017-11-22 LAB
ABNORMAL IP MESSAGE: 1
ALBUMIN SERPL-MCNC: 3.3 G/DL (ref 3.3–4.9)
ALBUMIN/GLOB SERPL: 1.06 {RATIO}
ALP SERPL-CCNC: 96 IU/L (ref 42–121)
ALT SERPL-CCNC: 35 IU/L (ref 13–69)
ANION GAP SERPL CALC-SCNC: 15 MMOL/L (ref 8–16)
AST SERPL-CCNC: 23 IU/L (ref 15–46)
BASOPHILS # BLD AUTO: 0 10^3/UL (ref 0–0.1)
BASOPHILS NFR BLD: 0.5 % (ref 0–2)
BILIRUB DIRECT SERPL-MCNC: 0 MG/DL (ref 0–0.2)
BILIRUB SERPL-MCNC: 1.5 MG/DL (ref 0.2–1.3)
BUN SERPL-MCNC: 39 MG/DL (ref 7–20)
CALCIUM SERPL-MCNC: 8.9 MG/DL (ref 8.4–10.2)
CHLORIDE SERPL-SCNC: 109 MMOL/L (ref 97–110)
CO2 SERPL-SCNC: 26 MMOL/L (ref 21–31)
COLLECTION PERIOD: 24 HRS
CREAT SERPL-MCNC: 2.57 MG/DL (ref 0.61–1.24)
EOSINOPHIL # BLD: 0.1 10^3/UL (ref 0–0.5)
EOSINOPHIL NFR BLD: 1.3 % (ref 0–7)
ERYTHROCYTE [DISTWIDTH] IN BLOOD BY AUTOMATED COUNT: 15.5 % (ref 11.5–14.5)
GLOBULIN SER-MCNC: 3.1 G/DL (ref 1.3–3.2)
GLUCOSE SERPL-MCNC: 100 MG/DL (ref 70–220)
HCT VFR BLD CALC: 24.2 % (ref 42–52)
HGB BLD-MCNC: 7.8 G/DL (ref 14–18)
LYMPHOCYTES # BLD AUTO: 0.8 10^3/UL (ref 0.8–2.9)
LYMPHOCYTES NFR BLD AUTO: 9.9 % (ref 15–51)
MCH RBC QN AUTO: 29.8 PG (ref 29–33)
MCHC RBC AUTO-ENTMCNC: 32.2 G/DL (ref 32–37)
MCV RBC AUTO: 92.4 FL (ref 82–101)
MONOCYTES # BLD: 0.6 10^3/UL (ref 0.3–0.9)
MONOCYTES NFR BLD: 8.2 % (ref 0–11)
NEUTROPHILS # BLD: 6.2 10^3/UL (ref 1.6–7.5)
NEUTROPHILS NFR BLD AUTO: 79.7 % (ref 39–77)
NRBC # BLD MANUAL: 0 10^3/UL (ref 0–0)
NRBC BLD AUTO-RTO: 0.3 /100WBC (ref 0–0)
PLATELET # BLD: 144 10^3/UL (ref 140–415)
PMV BLD AUTO: 13.5 FL (ref 7.4–10.4)
POSITIVE DIFF: (no result)
POTASSIUM SERPL-SCNC: 4.5 MMOL/L (ref 3.5–5.1)
PROT SERPL-MCNC: 6.4 G/DL (ref 6.1–8.1)
RBC # BLD AUTO: 2.62 10^6/UL (ref 4.7–6.1)
SODIUM SERPL-SCNC: 145 MMOL/L (ref 135–144)
WBC # BLD AUTO: 7.8 10^3/UL (ref 4.8–10.8)

## 2017-11-22 RX ADMIN — HYDRALAZINE HYDROCHLORIDE PRN MG: 20 INJECTION INTRAMUSCULAR; INTRAVENOUS at 12:18

## 2017-11-22 RX ADMIN — FERROUS SULFATE TAB 325 MG (65 MG ELEMENTAL FE) SCH MG: 325 (65 FE) TAB at 15:33

## 2017-11-22 RX ADMIN — HYDROCODONE BITARTRATE AND ACETAMINOPHEN PRN TAB: 5; 325 TABLET ORAL at 22:08

## 2017-11-22 RX ADMIN — DILTIAZEM HYDROCHLORIDE SCH MG: 120 CAPSULE, EXTENDED RELEASE ORAL at 10:46

## 2017-11-22 RX ADMIN — LORAZEPAM PRN MG: 0.5 TABLET ORAL at 19:39

## 2017-11-22 RX ADMIN — DILTIAZEM HYDROCHLORIDE SCH MG: 120 CAPSULE, EXTENDED RELEASE ORAL at 20:45
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
## 2017-11-23 VITALS — DIASTOLIC BLOOD PRESSURE: 98 MMHG | SYSTOLIC BLOOD PRESSURE: 160 MMHG | RESPIRATION RATE: 19 BRPM

## 2017-11-23 VITALS — RESPIRATION RATE: 20 BRPM | SYSTOLIC BLOOD PRESSURE: 168 MMHG | DIASTOLIC BLOOD PRESSURE: 92 MMHG

## 2017-11-23 VITALS — SYSTOLIC BLOOD PRESSURE: 152 MMHG | RESPIRATION RATE: 20 BRPM | DIASTOLIC BLOOD PRESSURE: 83 MMHG

## 2017-11-23 VITALS — HEART RATE: 68 BPM

## 2017-11-23 VITALS — HEART RATE: 100 BPM

## 2017-11-23 VITALS — DIASTOLIC BLOOD PRESSURE: 75 MMHG | SYSTOLIC BLOOD PRESSURE: 132 MMHG | RESPIRATION RATE: 20 BRPM

## 2017-11-23 VITALS — HEART RATE: 80 BPM

## 2017-11-23 VITALS — HEART RATE: 72 BPM

## 2017-11-23 LAB
ABNORMAL IP MESSAGE: 1
ANION GAP SERPL CALC-SCNC: 19 MMOL/L (ref 8–16)
BASOPHILS # BLD AUTO: 0 10^3/UL (ref 0–0.1)
BASOPHILS NFR BLD: 0.4 % (ref 0–2)
BUN SERPL-MCNC: 44 MG/DL (ref 7–20)
CALCIUM SERPL-MCNC: 8.6 MG/DL (ref 8.4–10.2)
CHLORIDE SERPL-SCNC: 102 MMOL/L (ref 97–110)
CO2 SERPL-SCNC: 25 MMOL/L (ref 21–31)
CREAT SERPL-MCNC: 2.75 MG/DL (ref 0.61–1.24)
EOSINOPHIL # BLD: 0.1 10^3/UL (ref 0–0.5)
EOSINOPHIL NFR BLD: 1.4 % (ref 0–7)
ERYTHROCYTE [DISTWIDTH] IN BLOOD BY AUTOMATED COUNT: 15.4 % (ref 11.5–14.5)
GLUCOSE SERPL-MCNC: 96 MG/DL (ref 70–220)
HCT VFR BLD CALC: 24.7 % (ref 42–52)
HGB BLD-MCNC: 8 G/DL (ref 14–18)
LYMPHOCYTES # BLD AUTO: 0.9 10^3/UL (ref 0.8–2.9)
LYMPHOCYTES NFR BLD AUTO: 11.8 % (ref 15–51)
MCH RBC QN AUTO: 29.4 PG (ref 29–33)
MCHC RBC AUTO-ENTMCNC: 32.4 G/DL (ref 32–37)
MCV RBC AUTO: 90.8 FL (ref 82–101)
MONOCYTES # BLD: 0.6 10^3/UL (ref 0.3–0.9)
MONOCYTES NFR BLD: 8.2 % (ref 0–11)
NEUTROPHILS # BLD: 5.8 10^3/UL (ref 1.6–7.5)
NEUTROPHILS NFR BLD AUTO: 77.8 % (ref 39–77)
NRBC # BLD MANUAL: 0 10^3/UL (ref 0–0)
NRBC BLD AUTO-RTO: 0 /100WBC (ref 0–0)
PLATELET # BLD: 158 10^3/UL (ref 140–415)
PMV BLD AUTO: 13.7 FL (ref 7.4–10.4)
POSITIVE DIFF: (no result)
POTASSIUM SERPL-SCNC: 4.3 MMOL/L (ref 3.5–5.1)
RBC # BLD AUTO: 2.72 10^6/UL (ref 4.7–6.1)
SODIUM SERPL-SCNC: 142 MMOL/L (ref 135–144)
WBC # BLD AUTO: 7.4 10^3/UL (ref 4.8–10.8)

## 2017-11-23 RX ADMIN — DILTIAZEM HYDROCHLORIDE SCH MG: 120 CAPSULE, EXTENDED RELEASE ORAL at 09:04

## 2017-11-23 RX ADMIN — FERROUS SULFATE TAB 325 MG (65 MG ELEMENTAL FE) SCH MG: 325 (65 FE) TAB at 09:04

## 2017-11-23 NOTE — PDOCDIS
Discharge Instructions


DIAGNOSIS


Discharge Diagnosis


1.  AK I.  


2.  Hyperkalemia likely secondary to #1.  


3.  Right-sided chest pain.


4.  Acute encephalopathy.  


5.  Atrial fibrillation.  


6.  Essential hypertension.  


7.  CAD.  Patient is status post CABG in 2004.  


8.  Normocytic normochromic anemia.  


9.  Anxiety.





CONDITION


Patient Condition:  Stable





HOME CARE INSTRUCTIONS:


Special Diet:  Cardiac





FOLLOW UP/APPOINTMENTS


Follow-up Plan


1. Follow up with Dr. Almas Holley within one week


2. Follow up with your nephrologist in one week











NISHA LAWRENCE Nov 23, 2017 12:01
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
## 2018-05-31 ENCOUNTER — HOSPITAL ENCOUNTER (EMERGENCY)
Dept: HOSPITAL 54 - ER | Age: 78
Discharge: LEFT BEFORE DISCHARGE | End: 2018-05-31
Payer: MEDICAID

## 2018-05-31 VITALS — HEIGHT: 65 IN | BODY MASS INDEX: 27.49 KG/M2 | WEIGHT: 165 LBS

## 2018-05-31 VITALS — SYSTOLIC BLOOD PRESSURE: 151 MMHG | DIASTOLIC BLOOD PRESSURE: 117 MMHG

## 2018-05-31 DIAGNOSIS — I12.9: ICD-10-CM

## 2018-05-31 DIAGNOSIS — N18.9: ICD-10-CM

## 2018-05-31 DIAGNOSIS — R79.89: ICD-10-CM

## 2018-05-31 DIAGNOSIS — R74.8: ICD-10-CM

## 2018-05-31 DIAGNOSIS — Z79.899: ICD-10-CM

## 2018-05-31 DIAGNOSIS — D69.6: ICD-10-CM

## 2018-05-31 DIAGNOSIS — Z98.890: ICD-10-CM

## 2018-05-31 DIAGNOSIS — I48.91: Primary | ICD-10-CM

## 2018-05-31 DIAGNOSIS — R00.2: ICD-10-CM

## 2018-05-31 DIAGNOSIS — I70.0: ICD-10-CM

## 2018-05-31 DIAGNOSIS — Z79.01: ICD-10-CM

## 2018-05-31 DIAGNOSIS — Z95.5: ICD-10-CM

## 2018-05-31 DIAGNOSIS — Z88.8: ICD-10-CM

## 2018-05-31 DIAGNOSIS — D63.1: ICD-10-CM

## 2018-05-31 LAB
ALBUMIN SERPL BCP-MCNC: 3.4 G/DL (ref 3.4–5)
ALP SERPL-CCNC: 97 U/L (ref 46–116)
ALT SERPL W P-5'-P-CCNC: 25 U/L (ref 12–78)
APTT PPP: 35 SEC (ref 23–34)
AST SERPL W P-5'-P-CCNC: 15 U/L (ref 15–37)
BASOPHILS # BLD AUTO: 0 /CMM (ref 0–0.2)
BASOPHILS NFR BLD AUTO: 0.8 % (ref 0–2)
BILIRUB SERPL-MCNC: 0.9 MG/DL (ref 0.2–1)
BUN SERPL-MCNC: 45 MG/DL (ref 7–18)
CALCIUM SERPL-MCNC: 7.6 MG/DL (ref 8.5–10.1)
CHLORIDE SERPL-SCNC: 108 MMOL/L (ref 98–107)
CO2 SERPL-SCNC: 21 MMOL/L (ref 21–32)
CREAT SERPL-MCNC: 3.5 MG/DL (ref 0.6–1.3)
EOSINOPHIL NFR BLD AUTO: 4 % (ref 0–6)
GLUCOSE SERPL-MCNC: 104 MG/DL (ref 74–106)
HCT VFR BLD AUTO: 29 % (ref 39–51)
HGB BLD-MCNC: 9.8 G/DL (ref 13.5–17.5)
INR PPP: 3.41 (ref 0.85–1.15)
LYMPHOCYTES NFR BLD AUTO: 0.7 /CMM (ref 0.8–4.8)
LYMPHOCYTES NFR BLD AUTO: 12.4 % (ref 20–44)
MAGNESIUM SERPL-MCNC: 1.6 MG/DL (ref 1.8–2.4)
MCHC RBC AUTO-ENTMCNC: 34 G/DL (ref 31–36)
MCV RBC AUTO: 90 FL (ref 80–96)
MONOCYTES NFR BLD AUTO: 0.5 /CMM (ref 0.1–1.3)
MONOCYTES NFR BLD AUTO: 9 % (ref 2–12)
NEUTROPHILS # BLD AUTO: 4 /CMM (ref 1.8–8.9)
NEUTROPHILS NFR BLD AUTO: 73.8 % (ref 43–81)
PLATELET # BLD AUTO: 120 /CMM (ref 150–450)
POTASSIUM SERPL-SCNC: 4.9 MMOL/L (ref 3.5–5.1)
PROT SERPL-MCNC: 6.5 G/DL (ref 6.4–8.2)
RBC # BLD AUTO: 3.2 MIL/UL (ref 4.5–6)
RDW COEFFICIENT OF VARIATION: 17 (ref 11.5–15)
SODIUM SERPL-SCNC: 140 MMOL/L (ref 136–145)
WBC NRBC COR # BLD AUTO: 5.4 K/UL (ref 4.3–11)

## 2018-05-31 PROCEDURE — A4606 OXYGEN PROBE USED W OXIMETER: HCPCS

## 2018-05-31 PROCEDURE — Z7610: HCPCS

## 2018-05-31 NOTE — NUR
BB SELF; "SENT BY MD ANDREWS FOR EVAL OF HIGH BP AND HIGH PULSE". SEEN BY MD 
FOR EVAL. DENIES CP. NAD NOTED. FAMILY MEMBER AST BS. SAFETY AND COMFORT 
MEASURES PROVIDED. WILL MONITOR.